# Patient Record
Sex: MALE | Race: OTHER | HISPANIC OR LATINO | Employment: FULL TIME | URBAN - METROPOLITAN AREA
[De-identification: names, ages, dates, MRNs, and addresses within clinical notes are randomized per-mention and may not be internally consistent; named-entity substitution may affect disease eponyms.]

---

## 2017-12-20 ENCOUNTER — HOSPITAL ENCOUNTER (EMERGENCY)
Facility: HOSPITAL | Age: 27
Discharge: HOME | End: 2017-12-20
Attending: EMERGENCY MEDICINE
Payer: COMMERCIAL

## 2017-12-20 VITALS
OXYGEN SATURATION: 99 % | DIASTOLIC BLOOD PRESSURE: 75 MMHG | SYSTOLIC BLOOD PRESSURE: 141 MMHG | RESPIRATION RATE: 18 BRPM | WEIGHT: 210 LBS | TEMPERATURE: 98.6 F | HEART RATE: 92 BPM

## 2017-12-20 DIAGNOSIS — K62.5 RECTAL BLEEDING: Primary | ICD-10-CM

## 2017-12-20 DIAGNOSIS — K60.2 ANAL FISSURE: ICD-10-CM

## 2017-12-20 LAB
ALBUMIN SERPL BCP-MCNC: 4.2 G/DL (ref 3.5–5)
ALP SERPL-CCNC: 60 U/L (ref 46–116)
ALT SERPL W P-5'-P-CCNC: 51 U/L (ref 12–78)
ANION GAP SERPL CALCULATED.3IONS-SCNC: 8 MMOL/L (ref 4–13)
AST SERPL W P-5'-P-CCNC: 21 U/L (ref 5–45)
BASOPHILS # BLD AUTO: 0.03 THOUSANDS/ΜL (ref 0–0.1)
BASOPHILS NFR BLD AUTO: 0 % (ref 0–1)
BILIRUB SERPL-MCNC: 0.42 MG/DL (ref 0.2–1)
BUN SERPL-MCNC: 16 MG/DL (ref 5–25)
CALCIUM SERPL-MCNC: 10 MG/DL (ref 8.3–10.1)
CHLORIDE SERPL-SCNC: 104 MMOL/L (ref 100–108)
CO2 SERPL-SCNC: 28 MMOL/L (ref 21–32)
CREAT SERPL-MCNC: 1.06 MG/DL (ref 0.6–1.3)
EOSINOPHIL # BLD AUTO: 0.08 THOUSAND/ΜL (ref 0–0.61)
EOSINOPHIL NFR BLD AUTO: 1 % (ref 0–6)
ERYTHROCYTE [DISTWIDTH] IN BLOOD BY AUTOMATED COUNT: 13.1 % (ref 11.6–15.1)
GFR SERPL CREATININE-BSD FRML MDRD: 95 ML/MIN/1.73SQ M
GLUCOSE SERPL-MCNC: 103 MG/DL (ref 65–140)
HCT VFR BLD AUTO: 42.8 % (ref 36.5–49.3)
HGB BLD-MCNC: 14.4 G/DL (ref 12–17)
LIPASE SERPL-CCNC: 95 U/L (ref 73–393)
LYMPHOCYTES # BLD AUTO: 2.37 THOUSANDS/ΜL (ref 0.6–4.47)
LYMPHOCYTES NFR BLD AUTO: 23 % (ref 14–44)
MCH RBC QN AUTO: 31 PG (ref 26.8–34.3)
MCHC RBC AUTO-ENTMCNC: 33.6 G/DL (ref 31.4–37.4)
MCV RBC AUTO: 92 FL (ref 82–98)
MONOCYTES # BLD AUTO: 1.1 THOUSAND/ΜL (ref 0.17–1.22)
MONOCYTES NFR BLD AUTO: 10 % (ref 4–12)
NEUTROPHILS # BLD AUTO: 6.95 THOUSANDS/ΜL (ref 1.85–7.62)
NEUTS SEG NFR BLD AUTO: 66 % (ref 43–75)
NRBC BLD AUTO-RTO: 0 /100 WBCS
PLATELET # BLD AUTO: 322 THOUSANDS/UL (ref 149–390)
PMV BLD AUTO: 10.7 FL (ref 8.9–12.7)
POTASSIUM SERPL-SCNC: 3.7 MMOL/L (ref 3.5–5.3)
PROT SERPL-MCNC: 8.9 G/DL (ref 6.4–8.2)
RBC # BLD AUTO: 4.64 MILLION/UL (ref 3.88–5.62)
SODIUM SERPL-SCNC: 140 MMOL/L (ref 136–145)
WBC # BLD AUTO: 10.53 THOUSAND/UL (ref 4.31–10.16)

## 2017-12-20 PROCEDURE — 80053 COMPREHEN METABOLIC PANEL: CPT | Performed by: EMERGENCY MEDICINE

## 2017-12-20 PROCEDURE — 83690 ASSAY OF LIPASE: CPT | Performed by: EMERGENCY MEDICINE

## 2017-12-20 PROCEDURE — 96360 HYDRATION IV INFUSION INIT: CPT

## 2017-12-20 PROCEDURE — 85025 COMPLETE CBC W/AUTO DIFF WBC: CPT | Performed by: EMERGENCY MEDICINE

## 2017-12-20 PROCEDURE — 99284 EMERGENCY DEPT VISIT MOD MDM: CPT

## 2017-12-20 PROCEDURE — 36415 COLL VENOUS BLD VENIPUNCTURE: CPT | Performed by: EMERGENCY MEDICINE

## 2017-12-20 RX ADMIN — SODIUM CHLORIDE 1000 ML: 0.9 INJECTION, SOLUTION INTRAVENOUS at 18:03

## 2017-12-20 NOTE — ED ATTENDING ATTESTATION
Greg Donnelly MD, saw and evaluated the patient  I have discussed the patient with the resident/non-physician practitioner and agree with the resident's/non-physician practitioner's findings, Plan of Care, and MDM as documented in the resident's/non-physician practitioner's note, except where noted  All available labs and Radiology studies were reviewed  At this point I agree with the current assessment done in the Emergency Department  I have conducted an independent evaluation of this patient a history and physical is as follows:    79-year-old male presents for evaluation of rectal bleeding  Patient states he has had several episodes of diarrhea with blood mixed in and blood with wiping  He states he has had blood with wiping previously /after straining to move his bowels  Denies abdominal pain, history suggesting anemia, blood thinner use, recent travel/sick contacts  Ten systems reviewed otherwise negative  Exam acute distress, HEENT normal lungs normal cardiac normal, abdomen normal, rectal exam;-heme-positive stool, anal fissure at the 12 o'clock position without active extravasation  Medical decision making;-bloody bowel movements likely secondary to anal fissure was suggested intra-abdomina patholog    Will check abdominal lab, IV fluids, PCP follow-up    Critical Care Time  CritCare Time    Procedures

## 2017-12-20 NOTE — ED PROVIDER NOTES
History  Chief Complaint   Patient presents with    Rectal Bleeding     pt states he noted blood in stool last night with a small clot, pt noted same today after work  diarrhea on both occasions  pt states is bright red  subjective fevers denies n/v  recent cold treated with OTC med  HPI  This is a 62-year-old male presenting for evaluation of rectal bleeding  Patient says that yesterday, he had an episode of diarrhea after swab straining  Patient noticed some blood in the stool in the toilet bowl  Patient had another episode today with diarrhea and blood in the bowl  Patient denies any pain with defecation  Patient says he does have history of constipation and straining, says that he has had blood on wiping before  Patient denies any history of hemorrhoids that he is aware of  Patient denies abdominal pain, no nausea vomiting  Patient did have an upper respiratory infection about 4 days ago  Patient did recently return from the \[facility] [place] [facility]\""  Patient denies any known sick contacts  No fevers and chills  He states that he is eating and drinking okay  Patient otherwise has no medical problems, no surgical history, denies smoking, drinking drug use  Patient denies headache, no chest pain shortness of breath  Denies any urinary symptoms, no hematuria  None       History reviewed  No pertinent past medical history  History reviewed  No pertinent surgical history  History reviewed  No pertinent family history  I have reviewed and agree with the history as documented  Social History   Substance Use Topics    Smoking status: Never Smoker    Smokeless tobacco: Never Used    Alcohol use No        Review of Systems    Constitutional: Negative for appetite change, chills and fever  HENT: Negative for congestion, rhinorrhea and sore throat  Eyes: Negative for photophobia, pain and visual disturbance  Respiratory: Negative for cough, chest tightness and shortness of breath  Cardiovascular: Negative for chest pain, palpitations and leg swelling  Gastrointestinal: Negative for abdominal pain, , nausea and vomiting  positive for diarrhea  Genitourinary: Negative for dysuria, flank pain and hematuria  Musculoskeletal: Negative for back pain, neck pain and neck stiffness  Skin: Negative for color change, rash and wound  Neurological: Negative for dizziness, numbness and headaches  All other systems reviewed and are negative      Physical Exam  ED Triage Vitals [12/20/17 1646]   Temperature Pulse Respirations Blood Pressure SpO2   98 6 °F (37 °C) 105 18 125/68 97 %      Temp Source Heart Rate Source Patient Position - Orthostatic VS BP Location FiO2 (%)   Oral Monitor Sitting Right arm --      Pain Score       5           Orthostatic Vital Signs  Vitals:    12/20/17 1646 12/20/17 1902   BP: 125/68 141/75   Pulse: 105 92   Patient Position - Orthostatic VS: Sitting Lying       Physical Exam  /75   Pulse 92   Temp 98 6 °F (37 °C) (Oral)   Resp 18   Wt 95 3 kg (210 lb)   SpO2 99%     General Appearance:  Alert, cooperative, no distress, appears stated age   Head:  Normocephalic, without obvious abnormality, atraumatic   Eyes:  PERRL, conjunctiva/corneas clear, EOM's intact       Nose: Nares normal, septum midline, mucosa normal, no drainage or sinus tenderness   Throat: Lips, mucosa, and tongue normal; teeth and gums normal   Neck: Supple, symmetrical, trachea midline, no adenopathy   Back:   Symmetric, no curvature, ROM normal, no CVA tenderness   Lungs:   Clear to auscultation bilaterally, respirations unlabored   Chest Wall:  No tenderness or deformity   Heart:  Regular rate and rhythm, S1, S2 normal, no murmur, rub or gallop   Abdomen:   Soft, non-tender, bowel sounds active all four quadrants       Rectal:  Normal tone, normal prostate, no masses or tenderness;  guaiac positive stool, there is a fissure at the 12 o'clock position without active bleeding Extremities: Extremities normal, atraumatic, no cyanosis or edema   Pulses: 2+ and symmetric   Skin: Skin color, texture, turgor normal, no rashes or lesions       Neurologic:      Psychiatric:  Moves all extremities, sensation and strength in tact in all extremities    Normal mood and affect         ED Medications  Medications   sodium chloride 0 9 % bolus 1,000 mL (0 mL Intravenous Stopped 12/20/17 1912)       Diagnostic Studies  Results Reviewed     Procedure Component Value Units Date/Time    Comprehensive metabolic panel [[id number]]  (Abnormal) Collected:  12/20/17 1804    Lab Status:  Final result Specimen:  Blood from Arm, Right Updated:  12/20/17 1827     Sodium 140 mmol/L      Potassium 3 7 mmol/L      Chloride 104 mmol/L      CO2 28 mmol/L      Anion Gap 8 mmol/L      BUN 16 mg/dL      Creatinine 1 06 mg/dL      Glucose 103 mg/dL      Calcium 10 0 mg/dL      AST 21 U/L      ALT 51 U/L      Alkaline Phosphatase 60 U/L      Total Protein 8 9 (H) g/dL      Albumin 4 2 g/dL      Total Bilirubin 0 42 mg/dL      eGFR 95 ml/min/1 73sq m     Narrative:         National Kidney Disease Education Program recommendations are as follows:  GFR calculation is accurate only with a steady state creatinine  Chronic Kidney disease less than 60 ml/min/1 73 sq  meters  Kidney failure less than 15 ml/min/1 73 sq  meters      Lipase [[id number]]  (Normal) Collected:  12/20/17 1804    Lab Status:  Final result Specimen:  Blood from Arm, Right Updated:  12/20/17 1827     Lipase 95 u/L     CBC and differential [[id number]]  (Abnormal) Collected:  12/20/17 1804    Lab Status:  Final result Specimen:  Blood from Arm, Right Updated:  12/20/17 1813     WBC 10 53 (H) Thousand/uL      RBC 4 64 Million/uL      Hemoglobin 14 4 g/dL      Hematocrit 42 8 %      MCV 92 fL      MCH 31 0 pg      MCHC 33 6 g/dL      RDW 13 1 %      MPV 10 7 fL      Platelets 855 Thousands/uL      nRBC 0 /100 WBCs      Neutrophils Relative 66 %      Lymphocytes Relative 23 %      Monocytes Relative 10 %      Eosinophils Relative 1 %      Basophils Relative 0 %      Neutrophils Absolute 6 95 Thousands/µL      Lymphocytes Absolute 2 37 Thousands/µL      Monocytes Absolute 1 10 Thousand/µL      Eosinophils Absolute 0 08 Thousand/µL      Basophils Absolute 0 03 Thousands/µL                  No orders to display         Procedures  Procedures      Phone Consults  ED Phone Contact    ED Course  ED Course            MDM   Patient symptoms likely related to anal fissure from constipation/straining  Patient may also have little bit of irritation in the bowel mucosa from the diarrhea, likely viral enteritis  Given the patient is tachycardic, will check abdominal labs, will give 1 L bolus, and reassess  Patient will need follow up with PCP and GI  CritCare Time    Disposition  Final diagnoses:   Rectal bleeding   Anal fissure     Time reflects when diagnosis was documented in both MDM as applicable and the Disposition within this note     Time User Action Codes Description Comment    12/20/2017  7:20 PM Dub Panning Add [K62 5] Rectal bleeding     12/20/2017  7:20 PM Dub Panning Add [K60 2] Anal fissure       ED Disposition     ED Disposition Condition Comment    Discharge  [facility] discharge to home/self care  Condition at discharge: Stable        Follow-up Information     Follow up With Specialties Details Why Luci Nur  Call in 1 day  [address] [facility] [place] [facility] Call in 1 day  [facility] [id number] [phone]    Your PCP  Schedule an appointment as soon as possible for a visit in 1 day          Discharge Medication List as of 12/20/2017  7:25 PM      START taking these medications    Details   docusate sodium (COLACE) 50 mg capsule Take 2 capsules by mouth daily, Starting Wed 12/20/2017, Print           No discharge procedures on file      ED Provider  Attending physically available and evaluated Ana Laura Costa I managed the patient along with the ED Attending      Electronically Signed by         Owen Elizalde MD  Resident  12/21/17 2325

## 2017-12-21 NOTE — DISCHARGE INSTRUCTIONS
Anal Fissure   WHAT YOU NEED TO KNOW:   What is an anal fissure? An anal fissure is a cut or tear in the tissue inside your anus  An anal fissure may be acute or chronic  An acute anal fissure is usually small and shallow and often heals without treatment  A chronic fissure may last longer than a month and will usually require treatment  A chronic anal fissure comes back after treatment  What causes an anal fissure? Anal fissures may occur when your anal muscle becomes too tight  Your anal muscle forms a ring around your anus and helps control your bowel movements  When this muscle becomes too tight, there is decreased blood flow to your anus  You may also have too much pressure around your anus  Other possible causes may include any of the following:  · Bowel problems:  Constipation may cause you to strain, which may cause an anal fissure  Certain bowel diseases may also cause anal fissures, including Crohn disease and inflammatory bowel disease  · Cancer:  Cancer in your anus may cause your anal tissue to tear  Leukemia is another cancer that may cause you to have an anal fissure  Treatments for cancer, such as chemotherapy, may also cause an anal fissure  · Infections:  Certain infections, such as HIV, syphilis, and tuberculosis may increase your risk for an anal fissure  · Trauma: The area around your anus may tear when you give birth  Anal trauma may also be caused by anal intercourse  What are the signs and symptoms of an anal fissure? · Pain that lasts several hours around your anus after you have a bowel movement    · Bleeding from your anus    · Bright red blood in your bowel movement or spots of blood on your toilet paper    · Pain while you urinate or have sex    · Spasms in your anus    · Itching around your anus  How is an anal fissure diagnosed? Your healthcare provider will ask about your symptoms and when they started   He may ask about your bowel movements, diet, and medicines that you take  He may also ask if you have any other medical conditions  Tell your healthcare provider if you have had any procedure or surgery done in or around your anus  Your healthcare provider will look at your anus to check for cuts or tears  He may put a gloved finger inside your anus to check for a tear or cut in your anus  If you are in severe pain, you may get local anesthesia  Your healthcare provider may also remove a piece of anal tissue and send it to a lab for testing  How is an anal fissure treated? You may also need to have the cause of your anal fissure treated  You may need any of the following to treat your anal fissure:  · Home care:      ¨ Sitz bath: You may need to soak in a warm tub or take a sitz bath  A sitz bath may decrease your pain and relax your anal muscle  You may need to do this more than once a day  Ask your healthcare provider for information on how to use a sitz bath and how often you should bathe  ¨ Nutrition:  Eat foods that are high in fiber  This will help keep your bowel movements soft  High-fiber foods include fruits, vegetables, and whole grains  ¨ Drink more liquids:  Liquids may help soften your bowel movements  This will help prevent you from straining  Ask your healthcare provider how much liquid you should drink each day  · Medicine:      ¨ Stool softeners: Your healthcare provider may also give you medicine that makes your bowel movements softer  This helps prevent constipation  You will be less likely to strain and cause an anal fissure if you are not constipated  ¨ Pain medicine: Your healthcare provider may give you medicine to take away or decrease your pain  He will tell you how much to take and how often to take it  Take the medicine exactly as directed  Tell your healthcare provider if the pain medicine does not help, or if your pain comes back too soon  ¨ Topical medicine:  Topical medicine may be put just inside your anus   The medicine may help your anal muscle relax and increase blood flow to your anus  This medicine may contain anesthesia to help decrease your pain  Your healthcare provider will teach you the right way to use topical medicine  ¨ Botulinum toxin:  This is medicine given as a shot into the skin around your anus  It helps your anal muscle relax  Ask your healthcare provider for more information about botulinum toxin  · Surgery: You may need surgery if other treatments do not work  You may also need surgery if your anal fissure is very painful  Surgery is often used for chronic anal fissures  The most common surgery is called a lateral internal sphincterotomy  A small part of your anal muscle is cut to help relax your anal muscle and decrease your pain  Your healthcare provider may remove all or some of your anal fissure  This is called a fissurectomy  What are the risks of an anal fissure? · Topical medicine for your anal fissure may give you a headache or make you feel lightheaded  Your skin may become red and you may also have a burning feeling on your anus  The botulinum toxin shot may hurt and may cause muscle weakness  It may also cause a painful infection of the tissue covering your anal muscles  You may also be less able to feel the area in and around your anus  Botulinum toxin or surgery may make you unable to control your bowel movements or passing gas  This is called incontinence  Surgery may also cause bleeding, an infection, or injury to your anal tissue  Even with treatment, your anal fissure may occur again  · Without treatment, your anal fissure may deepen or become infected  You may develop an abnormal opening from your anus to nearby organs  Scar tissue may form in your anus and cause it to become narrow  Without treatment, you may have worse pain during bowel movements  When should I contact my healthcare provider? · You are unable to have a bowel movement      · You have spasms in your anus that do not stop  When should I seek immediate care or call 911? · You have very bad pain in or around your anus  · You have bleeding from your anus that does not stop  CARE AGREEMENT:   You have the right to help plan your care  Learn about your health condition and how it may be treated  Discuss treatment options with your caregivers to decide what care you want to receive  You always have the right to refuse treatment  The above information is an  only  It is not intended as medical advice for individual conditions or treatments  Talk to your doctor, nurse or pharmacist before following any medical regimen to see if it is safe and effective for you  © 2017 [facility] Information is for End User's use only and may not be sold, redistributed or otherwise used for commercial purposes  All illustrations and images included in CareNotes® are the copyrighted property of [employer]  or [employer]  Rectal Bleeding   WHAT YOU NEED TO KNOW:   What can cause rectal bleeding? · Constipation    · Hemorrhoids (swollen blood vessels in your rectum)    · Anal fissures (tears in the tissue inside your anus)    · Medical conditions, such as cancer, colitis, or diverticulitis     · Growths, such as tumors or polyps    · Medical treatments, such as radiation or rectal surgery  What increases my risk for rectal bleeding? · Older age    · Certain medicines, such as blood thinners and NSAIDs    · Medical conditions, such as inflammatory bowel disease, liver disease, or HIV  What other signs and symptoms may happen with rectal bleeding? You may have pain in your rectum or anus  You may also have abdominal pain or cramping  How is the cause of rectal bleeding diagnosed? · Rectal exam:  Your healthcare provider may gently insert a gloved finger into your anus  He will collect a bowel movement sample and send it to a lab for tests  · Blood tests:   You may need blood taken to check for anemia (low amount of red blood cells)  · CT scan: This test is also called a CAT scan  An x-ray machine uses a computer to take pictures of the organs and blood vessels in your abdomen  The pictures may show problems that could cause bleeding  You may be given a dye before the pictures are taken to help healthcare providers see the pictures better  Tell the healthcare provider if you have ever had an allergic reaction to contrast dye  · Colonoscopy: This is a procedure to look inside your lower bowel  It may show where the bleeding is coming from and what is causing it  A tube with a light on the end will be put into your anus and then moved into your colon  If your healthcare provider finds a growth, he may remove it  · Endoscopy: This is a procedure to look inside your upper bowel  It may show where the bleeding is coming from and what is causing it  A tube with a light on the end is inserted into your throat and moved down into your stomach and upper bowel  If your healthcare provider finds a growth, he may remove it  He may put a shot of medicine in bleeding areas to narrow the blood vessels and stop the bleeding  Heat, laser, or electric currents may also be used to make the blood clot  How is rectal bleeding treated? · Medicine:      ¨ Pain medicine: You may be given a prescription medicine to decrease pain  Do not wait until the pain is severe before you take this medicine  ¨ Vasoconstrictors: This medicine decreases the size of your blood vessels and may help stop the bleeding  ¨ Iron supplement:  Iron helps your body make more red blood cells  ¨ Steroids: This medicine decreases inflammation in your rectum  It may be applied as a cream, ointment, or lotion  · IV:  You may need an IV if you are dehydrated and need extra liquids  · Blood transfusion:  You will get whole or parts of blood through an IV during a transfusion   Blood is tested for diseases, such as hepatitis and HIV, to be sure it is safe  · Surgery: You may need surgery to remove hemorrhoids, tumors, or polyps  What are the risks of rectal bleeding? · You may have abdominal pain or damage to nearby organs and blood vessels with surgery  Even with treatment, rectal bleeding may continue  Or, it may go away for a time and start again  · Without treatment, you may continue to have pain and cramping  You may develop anemia  You may need a blood transfusion  You may lose a large amount of blood  This can be life-threatening  How can I manage my symptoms? Ask your healthcare provider how much liquid to drink each day and which liquids are best for you  This will help prevent dehydration and constipation  When should I contact my healthcare provider? · You have a fever  · Your rectal bleeding stopped for a time, but has started again  · You have nausea  · You have cold, sweaty, pale skin  · You have changes in your bowel movements, such as diarrhea  · You have questions or concerns about your condition or care  When should I seek immediate care or call 911? · You are breathing faster than usual     · You are dizzy, lightheaded, or feel faint  · You are confused or cannot think clearly  · You urinate less than usual or not at all  · Your rectal bleeding is constant or heavy  · You have severe abdominal pain or cramping  CARE AGREEMENT:   You have the right to help plan your care  Learn about your health condition and how it may be treated  Discuss treatment options with your caregivers to decide what care you want to receive  You always have the right to refuse treatment  The above information is an  only  It is not intended as medical advice for individual conditions or treatments  Talk to your doctor, nurse or pharmacist before following any medical regimen to see if it is safe and effective for you    © 2017 [employer] Information is for End User's use only and may not be sold, redistributed or otherwise used for commercial purposes  All illustrations and images included in CareNotes® are the copyrighted property of [employer]  or [employer]  Laxative, Stool Softeners (By mouth)   Treats constipation by helping you have a bowel movement  Brand Name(s): Col-Rite, Colace, Colace Clear, DSS, Diocto, Diocto Liquid, Doc-Q-Lace, Docuprene, Docusil, Dok, Dulcolax, Fleet Sof-Lax, [facility] Docusate Calcium, Good [facility] Stool Softener, Good [facility] Stool Softner   There may be other brand names for this medicine  When This Medicine Should Not Be Used: You should not use this medicine if you have severe stomach pain, nausea, or vomiting  Stool softeners should not be used if you have severe stomach pain and do not know the cause  How to Use This Medicine:   Capsule, Tablet, Liquid, Liquid Filled Capsule  · Your doctor will tell you how much medicine to use  Do not use more than directed  · Follow the instructions on the medicine label if you are using this medicine without a prescription  · Drink 6 to 8 glasses of water daily while using any laxative  · To make the oral liquid taste better, you may mix it with one-half glass of milk or fruit juice  · Measure the oral liquid medicine with a marked measuring spoon, oral syringe, medicine cup, or medicine dropper  If a dose is missed:   · Use the missed dose as soon as possible  · If you do not remember the missed dose until the next day, skip the missed dose and go back to your regular dosing schedule  · You should not use two doses at the same time  How to Store and Dispose of This Medicine:   · Store the medicine in a tightly closed container at room temperature, away from heat and moisture  Do not store liquid-filled capsules in the refrigerator  · Keep all medicine out of the reach of children    Drugs and Foods to Avoid:   Ask your doctor or pharmacist before using any other medicine, including over-the-counter medicines, vitamins, and herbal products  · You should not use mineral oil while you are using a stool softener  · You should not use a stool softener within 2 hours before or after taking any other medicines  Laxatives can keep other medicines from working correctly  Warnings While Using This Medicine:   · If you are pregnant or breastfeeding, talk to your doctor before taking this medicine  · Do not give laxatives to children under 10years old unless you talk to your doctor  · You should not use this laxative for longer than 1 week unless approved by your doctor  Laxatives may be habit-forming and can harm your bowels if you use them too long  · Stool softeners usually work in 1 to 2 days, but for some people, results can take as long as 3 to 5 days  Possible Side Effects While Using This Medicine: If you notice these less serious side effects, talk with your doctor:  · Nausea  · Sore throat  · Skin rash  If you notice other side effects that you think are caused by this medicine, tell your doctor  Call your doctor for medical advice about side effects  You may report side effects to FDA at 1-826-FDA-1894  © 2017 [address] Information is for End User's use only and may not be sold, redistributed or otherwise used for commercial purposes  The above information is an  only  It is not intended as medical advice for individual conditions or treatments  Talk to your doctor, nurse or pharmacist before following any medical regimen to see if it is safe and effective for you  Sitz Bath   WHAT YOU NEED TO KNOW:   What is a sitz bath? A sitz bath is when you soak in a warm or hot water tub to promote wound healing  It is often done after surgeries on the rectal or genital area   The heat from the water will clean the area, improve blood flow for healing, and decrease swelling and pain  What supplies are needed for a sitz bath? · A bathtub thermometer to check the water temperature    · Towels to cover your upper body during the bath and to dry off after    · A footstool to help you enter the bathtub if needed    · Bath mats to prevent slips in the tub and after you get out of the tub    · Clean clothes to wear after the bath  How do I prepare the sitz bath? Fill the bathtub with water until it is at about the level of your belly button  Check the temperature of the water before you get in  For a warm water sitz bath, the water should be 94° to 98° Fahrenheit  A hot water sitz bath should be between 105° to 110° Fahrenheit  Stay in the bath for about 15 to 20 minutes  What if I cannot get in and out of the bathtub? You may be sent home with a portable sitz bath  This is a small tub that will fit under your toilet seat  You will fill the tub with water as directed once it is under the toilet seat  Check the temperature of the water  You will also have a squirt bottle or water bag filled with the warm water  These are used to let the water flow out over the wound area during the sitz bath  This is also done for 15 to 20 minutes  What else do I need to know about sitz baths? · You may have some discomfort at first when you sit in the warm water  This should go away shortly after entering the tub  · Check the water temperature a few times during the bath  You may need to add more water to keep it at the right temperature  · Take a sitz bath when someone is close by to help you if needed  The heat from the water may cause you to feel weak or lightheaded  If this happens, call for help to get out of the tub  Do not stand up on your own  in case you lose your balance  CARE AGREEMENT:   You have the right to help plan your care  Learn about your health condition and how it may be treated   Discuss treatment options with your caregivers to decide what care you want to receive  You always have the right to refuse treatment  The above information is an  only  It is not intended as medical advice for individual conditions or treatments  Talk to your doctor, nurse or pharmacist before following any medical regimen to see if it is safe and effective for you  © 2017 [address] Information is for End User's use only and may not be sold, redistributed or otherwise used for commercial purposes  All illustrations and images included in CareNotes® are the copyrighted property of [employer] M [employer]  or [employer]

## 2018-06-14 ENCOUNTER — OFFICE VISIT (OUTPATIENT)
Dept: INTERNAL MEDICINE CLINIC | Facility: CLINIC | Age: 28
End: 2018-06-14
Payer: COMMERCIAL

## 2018-06-14 VITALS
WEIGHT: 216.6 LBS | OXYGEN SATURATION: 98 % | HEART RATE: 94 BPM | SYSTOLIC BLOOD PRESSURE: 102 MMHG | DIASTOLIC BLOOD PRESSURE: 70 MMHG | BODY MASS INDEX: 36.09 KG/M2 | TEMPERATURE: 99.1 F | RESPIRATION RATE: 16 BRPM | HEIGHT: 65 IN

## 2018-06-14 DIAGNOSIS — E66.09 OBESITY DUE TO EXCESS CALORIES, UNSPECIFIED CLASSIFICATION, UNSPECIFIED WHETHER SERIOUS COMORBIDITY PRESENT: Primary | ICD-10-CM

## 2018-06-14 PROCEDURE — 99203 OFFICE O/P NEW LOW 30 MIN: CPT | Performed by: INTERNAL MEDICINE

## 2018-06-14 NOTE — PROGRESS NOTES
Assessment/Plan:     Diagnoses and all orders for this visit:    Obesity due to excess calories, unspecified classification, unspecified whether serious comorbidity present  -     Comprehensive metabolic panel  -     Lipid panel  -     CBC and differential  -     TSH, 3rd generation with Free T4 reflex      Fahad Curry was seen and examined in the office today  We discussed that his eating habits are not healthy and he really needs to work on this as well as losing weight  He has not had blood work in some time and this was ordered for him  Of note, he did have an episode of rectal bleeding and was seen in the ED for this  This was thought to be secondary to straining from constipation and has not occurred again  He was noted to have a fissure at that time  Otherwise no other changes were made  Subjective:      Patient ID: Naty Sanabria is a 28 y o  male  Fahad Curry is here today to establish care  Medical history was reviewed and updated  He is originally from [place] and currently is working here as a   He reports feeling okay but notes that his skin can get bothersome  He notes areas of dry skin but also reports that his face gets oily as well  The following portions of the patient's history were reviewed and updated as appropriate: allergies, current medications, past family history, past medical history, past social history, past surgical history and problem list     Review of Systems   Constitutional: Positive for fatigue  Negative for chills, fever and unexpected weight change  HENT: Negative for sinus pain, sinus pressure, sore throat, trouble swallowing and voice change  Eyes: Negative for visual disturbance  Respiratory: Negative for cough, chest tightness, shortness of breath and wheezing  Cardiovascular: Negative for chest pain, palpitations and leg swelling     Gastrointestinal: Negative for abdominal pain, blood in stool, constipation, diarrhea, nausea and vomiting  Genitourinary: Negative for difficulty urinating, dysuria, hematuria, scrotal swelling and testicular pain  Musculoskeletal: Positive for back pain  Negative for arthralgias and myalgias  Skin:        Dry skin and areas of pimples ; oily face   Allergic/Immunologic: Negative for environmental allergies  Neurological: Positive for headaches  Negative for dizziness, syncope and numbness  Hematological: Negative for adenopathy  Does not bruise/bleed easily  Psychiatric/Behavioral: Negative for dysphoric mood and sleep disturbance  The patient is not nervous/anxious  Objective:      /70 (BP Location: Left arm, Patient Position: Sitting, Cuff Size: Large)   Pulse 94   Temp 99 1 °F (37 3 °C) (Tympanic)   Resp 16   Ht 5' 5" (1 651 m)   Wt 98 2 kg (216 lb 9 6 oz)   SpO2 98%   BMI 36 04 kg/m²          Physical Exam   Constitutional: He is oriented to person, place, and time  He appears well-developed and well-nourished  No distress  Obese   HENT:   Head: Normocephalic and atraumatic  Mouth/Throat: Oropharynx is clear and moist    Eyes: Conjunctivae and EOM are normal  Right eye exhibits no discharge  Left eye exhibits no discharge  No scleral icterus  Neck: Normal range of motion  No thyromegaly present  Cardiovascular: Normal rate, regular rhythm and normal heart sounds  No murmur heard  Pulmonary/Chest: Effort normal and breath sounds normal  No respiratory distress  He has no wheezes  Abdominal: Soft  Bowel sounds are normal  There is no tenderness  There is no rebound  Musculoskeletal: Normal range of motion  He exhibits no edema  Lymphadenopathy:     He has no cervical adenopathy  Neurological: He is alert and oriented to person, place, and time  He has normal reflexes  No cranial nerve deficit  Skin: Skin is warm and dry  He is not diaphoretic  Tinea versicolor type rash of the abdomen noted   Psychiatric: He has a normal mood and affect   His speech is normal and behavior is normal  Judgment and thought content normal    Vitals reviewed

## 2018-06-25 ENCOUNTER — APPOINTMENT (OUTPATIENT)
Dept: LAB | Facility: HOSPITAL | Age: 28
End: 2018-06-25
Attending: INTERNAL MEDICINE
Payer: COMMERCIAL

## 2018-06-25 LAB
ALBUMIN SERPL BCP-MCNC: 4.3 G/DL (ref 3.5–5)
ALP SERPL-CCNC: 53 U/L (ref 46–116)
ALT SERPL W P-5'-P-CCNC: 28 U/L (ref 12–78)
ANION GAP SERPL CALCULATED.3IONS-SCNC: 9 MMOL/L (ref 4–13)
AST SERPL W P-5'-P-CCNC: 14 U/L (ref 5–45)
BASOPHILS # BLD AUTO: 0.06 THOUSANDS/ΜL (ref 0–0.1)
BASOPHILS NFR BLD AUTO: 1 % (ref 0–1)
BILIRUB SERPL-MCNC: 0.42 MG/DL (ref 0.2–1)
BUN SERPL-MCNC: 17 MG/DL (ref 5–25)
CALCIUM SERPL-MCNC: 9.6 MG/DL (ref 8.3–10.1)
CHLORIDE SERPL-SCNC: 103 MMOL/L (ref 100–108)
CHOLEST SERPL-MCNC: 155 MG/DL (ref 50–200)
CO2 SERPL-SCNC: 28 MMOL/L (ref 21–32)
CREAT SERPL-MCNC: 1.11 MG/DL (ref 0.6–1.3)
EOSINOPHIL # BLD AUTO: 0.12 THOUSAND/ΜL (ref 0–0.61)
EOSINOPHIL NFR BLD AUTO: 1 % (ref 0–6)
ERYTHROCYTE [DISTWIDTH] IN BLOOD BY AUTOMATED COUNT: 12.7 % (ref 11.6–15.1)
GFR SERPL CREATININE-BSD FRML MDRD: 90 ML/MIN/1.73SQ M
GLUCOSE SERPL-MCNC: 93 MG/DL (ref 65–140)
HCT VFR BLD AUTO: 42.8 % (ref 36.5–49.3)
HDLC SERPL-MCNC: 49 MG/DL (ref 40–60)
HGB BLD-MCNC: 14.5 G/DL (ref 12–17)
LDLC SERPL CALC-MCNC: 89 MG/DL (ref 0–100)
LYMPHOCYTES # BLD AUTO: 3.15 THOUSANDS/ΜL (ref 0.6–4.47)
LYMPHOCYTES NFR BLD AUTO: 32 % (ref 14–44)
MCH RBC QN AUTO: 30.9 PG (ref 26.8–34.3)
MCHC RBC AUTO-ENTMCNC: 33.9 G/DL (ref 31.4–37.4)
MCV RBC AUTO: 91 FL (ref 82–98)
MONOCYTES # BLD AUTO: 0.62 THOUSAND/ΜL (ref 0.17–1.22)
MONOCYTES NFR BLD AUTO: 6 % (ref 4–12)
NEUTROPHILS # BLD AUTO: 5.94 THOUSANDS/ΜL (ref 1.85–7.62)
NEUTS SEG NFR BLD AUTO: 60 % (ref 43–75)
NONHDLC SERPL-MCNC: 106 MG/DL
NRBC BLD AUTO-RTO: 0 /100 WBCS
PLATELET # BLD AUTO: 303 THOUSANDS/UL (ref 149–390)
PMV BLD AUTO: 10.2 FL (ref 8.9–12.7)
POTASSIUM SERPL-SCNC: 4 MMOL/L (ref 3.5–5.3)
PROT SERPL-MCNC: 8.7 G/DL (ref 6.4–8.2)
RBC # BLD AUTO: 4.69 MILLION/UL (ref 3.88–5.62)
SODIUM SERPL-SCNC: 140 MMOL/L (ref 136–145)
TRIGL SERPL-MCNC: 83 MG/DL
TSH SERPL DL<=0.05 MIU/L-ACNC: 2.97 UIU/ML (ref 0.36–3.74)
WBC # BLD AUTO: 9.89 THOUSAND/UL (ref 4.31–10.16)

## 2018-06-25 PROCEDURE — 80061 LIPID PANEL: CPT | Performed by: INTERNAL MEDICINE

## 2018-06-25 PROCEDURE — 85025 COMPLETE CBC W/AUTO DIFF WBC: CPT | Performed by: INTERNAL MEDICINE

## 2018-06-25 PROCEDURE — 80053 COMPREHEN METABOLIC PANEL: CPT | Performed by: INTERNAL MEDICINE

## 2018-06-25 PROCEDURE — 84443 ASSAY THYROID STIM HORMONE: CPT | Performed by: INTERNAL MEDICINE

## 2018-06-25 PROCEDURE — 36415 COLL VENOUS BLD VENIPUNCTURE: CPT | Performed by: INTERNAL MEDICINE

## 2018-06-27 ENCOUNTER — OFFICE VISIT (OUTPATIENT)
Dept: INTERNAL MEDICINE CLINIC | Facility: CLINIC | Age: 28
End: 2018-06-27
Payer: COMMERCIAL

## 2018-06-27 VITALS
TEMPERATURE: 98.7 F | DIASTOLIC BLOOD PRESSURE: 60 MMHG | HEIGHT: 65 IN | HEART RATE: 90 BPM | WEIGHT: 214 LBS | OXYGEN SATURATION: 99 % | RESPIRATION RATE: 18 BRPM | SYSTOLIC BLOOD PRESSURE: 110 MMHG | BODY MASS INDEX: 35.65 KG/M2

## 2018-06-27 DIAGNOSIS — Z00.00 ENCOUNTER FOR ANNUAL PHYSICAL EXAM: Primary | ICD-10-CM

## 2018-06-27 DIAGNOSIS — L30.9 DERMATITIS: ICD-10-CM

## 2018-06-27 PROBLEM — R23.4 OILY SKIN: Status: ACTIVE | Noted: 2018-06-27

## 2018-06-27 PROCEDURE — 99395 PREV VISIT EST AGE 18-39: CPT | Performed by: INTERNAL MEDICINE

## 2018-06-27 RX ORDER — BENZOYL PEROXIDE 10 G/100G
1 GEL TOPICAL DAILY
Qty: 45 G | Refills: 0 | Status: SHIPPED | OUTPATIENT
Start: 2018-06-27

## 2018-06-27 NOTE — PROGRESS NOTES
Assessment/Plan:   Diagnoses and all orders for this visit:    Dermatitis  -     benzoyl peroxide 10 % gel; Apply 1 application topically daily  -     triamcinolone (KENALOG) 0 1 % ointment; Apply topically 2 (two) times a day    Encounter for annual physical exam      Arlyn Granados was seen and examined in the office today  Blood work was reviewed and largely unremarkable  In terms of his oily skin complaint, I am going to have him try Benzoyl Peroxide to see if this helps at all  I have given him Kenalog to use the abdomen for possible dermatitis as well  Otherwise no other changes were made  Subjective:      Patient ID: Baldemar Almodovar is a 28 y o  male  Arlyn Granados is here today for a follow up visit on his bloodwork and physical  There are no changes from his recent visit and generally feels well  The following portions of the patient's history were reviewed and updated as appropriate: allergies, current medications, past family history, past medical history, past social history, past surgical history and problem list     Review of Systems   Constitutional: Negative for chills, fever and unexpected weight change  Respiratory: Negative for cough and chest tightness  Cardiovascular: Negative for chest pain  Gastrointestinal: Negative for abdominal pain, diarrhea, nausea and vomiting  Genitourinary: Negative for difficulty urinating  Musculoskeletal: Negative for arthralgias and myalgias  Skin: Positive for rash  Neurological: Negative for dizziness, numbness and headaches  Psychiatric/Behavioral: Negative for dysphoric mood and sleep disturbance  The patient is not nervous/anxious            Objective:      /60 (BP Location: Left arm, Patient Position: Sitting, Cuff Size: Large)   Pulse 90   Temp 98 7 °F (37 1 °C) (Tympanic)   Resp 18   Ht 5' 5" (1 651 m)   Wt 97 1 kg (214 lb)   SpO2 99%   BMI 35 61 kg/m²          Physical Exam   Constitutional: He is oriented to person, place, and time  He appears well-developed and well-nourished  No distress  HENT:   Head: Normocephalic and atraumatic  Eyes: Conjunctivae and EOM are normal  Right eye exhibits no discharge  Left eye exhibits no discharge  No scleral icterus  Neck: Normal range of motion  No tracheal deviation present  No thyromegaly present  Cardiovascular: Normal rate, regular rhythm and normal heart sounds  No murmur heard  Pulmonary/Chest: Effort normal and breath sounds normal  No respiratory distress  He has no wheezes  He exhibits no tenderness  Abdominal: Soft  Bowel sounds are normal    Musculoskeletal: Normal range of motion  He exhibits no edema  Lymphadenopathy:     He has no cervical adenopathy  Neurological: He is alert and oriented to person, place, and time  He has normal reflexes  No cranial nerve deficit  Skin: Skin is warm and dry  He is not diaphoretic  Scaly rash of the abdomen noted    Psychiatric: He has a normal mood and affect  His speech is normal and behavior is normal  Judgment and thought content normal    Vitals reviewed